# Patient Record
Sex: MALE | Race: OTHER | Employment: FULL TIME | URBAN - METROPOLITAN AREA
[De-identification: names, ages, dates, MRNs, and addresses within clinical notes are randomized per-mention and may not be internally consistent; named-entity substitution may affect disease eponyms.]

---

## 2024-06-14 ENCOUNTER — APPOINTMENT (OUTPATIENT)
Dept: CT IMAGING | Age: 29
End: 2024-06-14

## 2024-06-14 ENCOUNTER — HOSPITAL ENCOUNTER (EMERGENCY)
Age: 29
Discharge: HOME | End: 2024-06-14
Attending: EMERGENCY MEDICINE

## 2024-06-14 VITALS
SYSTOLIC BLOOD PRESSURE: 103 MMHG | RESPIRATION RATE: 16 BRPM | TEMPERATURE: 98.4 F | BODY MASS INDEX: 18.02 KG/M2 | HEART RATE: 80 BPM | DIASTOLIC BLOOD PRESSURE: 70 MMHG | WEIGHT: 148 LBS | OXYGEN SATURATION: 100 % | HEIGHT: 76 IN

## 2024-06-14 DIAGNOSIS — K72.90 HEPATIC INSUFFICIENCY (HCC): ICD-10-CM

## 2024-06-14 DIAGNOSIS — G40.909 SEIZURE DISORDER (HCC): Primary | ICD-10-CM

## 2024-06-14 LAB
ALBUMIN SERPL-MCNC: 4.6 GM/DL (ref 3.4–5)
ALCOHOL SCREEN SERUM: <0.01 %WT/VOL
ALP BLD-CCNC: 300 IU/L (ref 40–129)
ALT SERPL-CCNC: 190 U/L (ref 10–40)
AMPHETAMINES: NEGATIVE
ANION GAP SERPL CALCULATED.3IONS-SCNC: 19 MMOL/L (ref 7–16)
AST SERPL-CCNC: 556 IU/L (ref 15–37)
BACTERIA: ABNORMAL /HPF
BARBITURATE SCREEN URINE: NEGATIVE
BASOPHILS ABSOLUTE: 0 K/CU MM
BASOPHILS RELATIVE PERCENT: 1.1 % (ref 0–1)
BENZODIAZEPINE SCREEN, URINE: NEGATIVE
BILIRUB SERPL-MCNC: 1.4 MG/DL (ref 0–1)
BILIRUBIN, URINE: NEGATIVE MG/DL
BLOOD, URINE: NEGATIVE
BUN SERPL-MCNC: 4 MG/DL (ref 6–23)
CALCIUM SERPL-MCNC: 9.6 MG/DL (ref 8.3–10.6)
CANNABINOID SCREEN URINE: NEGATIVE
CAST TYPE: ABNORMAL /HPF
CHLORIDE BLD-SCNC: 97 MMOL/L (ref 99–110)
CLARITY: CLEAR
CO2: 20 MMOL/L (ref 21–32)
COCAINE METABOLITE: NEGATIVE
COLOR: YELLOW
COMMENT UA: ABNORMAL
CREAT SERPL-MCNC: 0.7 MG/DL (ref 0.9–1.3)
CRYSTAL TYPE: NEGATIVE /HPF
DIFFERENTIAL TYPE: ABNORMAL
EKG ATRIAL RATE: 82 BPM
EKG DIAGNOSIS: NORMAL
EKG P AXIS: 54 DEGREES
EKG P-R INTERVAL: 166 MS
EKG Q-T INTERVAL: 400 MS
EKG QRS DURATION: 90 MS
EKG QTC CALCULATION (BAZETT): 467 MS
EKG R AXIS: 28 DEGREES
EKG T AXIS: 54 DEGREES
EKG VENTRICULAR RATE: 82 BPM
EOSINOPHILS ABSOLUTE: 0 K/CU MM
EOSINOPHILS RELATIVE PERCENT: 1.1 % (ref 0–3)
EPITHELIAL CELLS, UA: NEGATIVE /HPF
FENTANYL URINE: NEGATIVE
GFR, ESTIMATED: >90 ML/MIN/1.73M2
GLUCOSE SERPL-MCNC: 237 MG/DL (ref 70–99)
GLUCOSE URINE: 250 MG/DL
HCT VFR BLD CALC: 41.8 % (ref 42–52)
HEMOGLOBIN: 13.9 GM/DL (ref 13.5–18)
IMMATURE NEUTROPHIL %: 0 % (ref 0–0.43)
INFLUENZA A ANTIGEN: NOT DETECTED
INFLUENZA B ANTIGEN: NOT DETECTED
KETONES, URINE: NEGATIVE MG/DL
LACTATE: 2.2 MMOL/L (ref 0.5–1.9)
LACTATE: 3.9 MMOL/L (ref 0.5–1.9)
LEUKOCYTE ESTERASE, URINE: NEGATIVE
LYMPHOCYTES ABSOLUTE: 0.5 K/CU MM
LYMPHOCYTES RELATIVE PERCENT: 28.3 % (ref 24–44)
MCH RBC QN AUTO: 31.3 PG (ref 27–31)
MCHC RBC AUTO-ENTMCNC: 33.3 % (ref 32–36)
MCV RBC AUTO: 94.1 FL (ref 78–100)
MONOCYTES ABSOLUTE: 0.2 K/CU MM
MONOCYTES RELATIVE PERCENT: 9.1 % (ref 0–4)
NEUTROPHILS ABSOLUTE: 1.1 K/CU MM
NEUTROPHILS RELATIVE PERCENT: 60.4 % (ref 36–66)
NITRITE URINE, QUANTITATIVE: NEGATIVE
OPIATES, URINE: NEGATIVE
OXYCODONE: NEGATIVE
PDW BLD-RTO: 13.7 % (ref 11.7–14.9)
PH, URINE: 6 (ref 5–8)
PLATELET # BLD: 86 K/CU MM (ref 140–440)
PMV BLD AUTO: 9.8 FL (ref 7.5–11.1)
POTASSIUM SERPL-SCNC: 3.9 MMOL/L (ref 3.5–5.1)
PROTEIN UA: ABNORMAL MG/DL
RBC # BLD: 4.44 M/CU MM (ref 4.6–6.2)
RBC URINE: NEGATIVE /HPF (ref 0–3)
SARS-COV-2 RDRP RESP QL NAA+PROBE: NOT DETECTED
SODIUM BLD-SCNC: 136 MMOL/L (ref 135–145)
SOURCE: NORMAL
SPECIFIC GRAVITY UA: 1.01 (ref 1–1.03)
TOTAL CK: 95 IU/L (ref 38–174)
TOTAL IMMATURE NEUTOROPHIL: 0 K/CU MM
TOTAL PROTEIN: 7.9 GM/DL (ref 6.4–8.2)
UROBILINOGEN, URINE: 1 MG/DL (ref 0.2–1)
WBC # BLD: 1.9 K/CU MM (ref 4–10.5)
WBC UA: <1 /HPF (ref 0–2)

## 2024-06-14 PROCEDURE — 80307 DRUG TEST PRSMV CHEM ANLYZR: CPT

## 2024-06-14 PROCEDURE — 82550 ASSAY OF CK (CPK): CPT

## 2024-06-14 PROCEDURE — G0480 DRUG TEST DEF 1-7 CLASSES: HCPCS

## 2024-06-14 PROCEDURE — 81001 URINALYSIS AUTO W/SCOPE: CPT

## 2024-06-14 PROCEDURE — 99284 EMERGENCY DEPT VISIT MOD MDM: CPT

## 2024-06-14 PROCEDURE — 80053 COMPREHEN METABOLIC PANEL: CPT

## 2024-06-14 PROCEDURE — 93010 ELECTROCARDIOGRAM REPORT: CPT | Performed by: INTERNAL MEDICINE

## 2024-06-14 PROCEDURE — 6360000002 HC RX W HCPCS: Performed by: EMERGENCY MEDICINE

## 2024-06-14 PROCEDURE — 96374 THER/PROPH/DIAG INJ IV PUSH: CPT

## 2024-06-14 PROCEDURE — 87635 SARS-COV-2 COVID-19 AMP PRB: CPT

## 2024-06-14 PROCEDURE — 87502 INFLUENZA DNA AMP PROBE: CPT

## 2024-06-14 PROCEDURE — 83605 ASSAY OF LACTIC ACID: CPT

## 2024-06-14 PROCEDURE — 85025 COMPLETE CBC W/AUTO DIFF WBC: CPT

## 2024-06-14 PROCEDURE — 87040 BLOOD CULTURE FOR BACTERIA: CPT

## 2024-06-14 PROCEDURE — 93005 ELECTROCARDIOGRAM TRACING: CPT | Performed by: EMERGENCY MEDICINE

## 2024-06-14 PROCEDURE — 70450 CT HEAD/BRAIN W/O DYE: CPT

## 2024-06-14 PROCEDURE — 2580000003 HC RX 258: Performed by: EMERGENCY MEDICINE

## 2024-06-14 PROCEDURE — 87389 HIV-1 AG W/HIV-1&-2 AB AG IA: CPT

## 2024-06-14 RX ORDER — LEVETIRACETAM 500 MG/1
500 TABLET ORAL 2 TIMES DAILY
Qty: 60 TABLET | Refills: 0 | Status: SHIPPED | OUTPATIENT
Start: 2024-06-14

## 2024-06-14 RX ORDER — 0.9 % SODIUM CHLORIDE 0.9 %
1000 INTRAVENOUS SOLUTION INTRAVENOUS ONCE
Status: COMPLETED | OUTPATIENT
Start: 2024-06-14 | End: 2024-06-14

## 2024-06-14 RX ORDER — LEVETIRACETAM 500 MG/5ML
1000 INJECTION, SOLUTION, CONCENTRATE INTRAVENOUS ONCE
Status: COMPLETED | OUTPATIENT
Start: 2024-06-14 | End: 2024-06-14

## 2024-06-14 RX ADMIN — SODIUM CHLORIDE 1000 ML: 9 INJECTION, SOLUTION INTRAVENOUS at 11:06

## 2024-06-14 RX ADMIN — SODIUM CHLORIDE 1000 ML: 9 INJECTION, SOLUTION INTRAVENOUS at 12:07

## 2024-06-14 RX ADMIN — LEVETIRACETAM 1000 MG: 500 INJECTION INTRAVENOUS at 11:07

## 2024-06-14 ASSESSMENT — PAIN - FUNCTIONAL ASSESSMENT: PAIN_FUNCTIONAL_ASSESSMENT: NONE - DENIES PAIN

## 2024-06-14 NOTE — DISCHARGE INSTR - COC
continuing treatment of the diagnosis listed and that he requires {Admit to Appropriate Level of Care:78069} for {GREATER/LESS:395064539} 30 days.     Update Admission H&P: {CHP DME Changes in HandP:449058229}    PHYSICIAN SIGNATURE:  {Esignature:[id number]}

## 2024-06-14 NOTE — ED NOTES
The patient presents to the er today with medics. The medics report that the patient had a seizure at his work. Nobody witnessed the seizure but he was \"found confused and foaming at the mouth.\"  When medics arrived he was initially very confused and trying to leave. On arrival he is alert to his name and place he did not know what year it was but was able to answer other questions appropriately. The call light is within reach.

## 2024-06-14 NOTE — ED NOTES
Discharge instruction and prescriptions were reviewed and the patient was given resources for follow up. The patient voiced understanding.

## 2024-06-14 NOTE — ED PROVIDER NOTES
eMERGENCY dEPARTMENT eNCOUnter        CHIEF COMPLAINT  Chief Complaint   Patient presents with    Seizures       hospitals    Luther Villalobos is a 29 y.o. male who presents with possible seizure.  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  At present he denies any headache, no chest pain, no shortness of breath, no abdominal pains, no recent illnesses, no focal neurological deficit no dizziness.  He did not eat this morning before going to work.  No old records available on this patient.    During the emergency department course patient appears to be slowly revealing that he drinks a lot he has a sister who lives in [place] can come by and pick him up he did have a primary physician but he retired.  Has not found a new physician yet  Currently he is on the Medicaid      REVIEW OF SYSTEMS    Cardiac: No chest pain or palpitations  Respiratory:No shortness of breath or new cough  General: No fevers or chills  : No dysuria or hematuria  GI: No vomiting or diarrhea  Musculoskeletal: Patient denies any shoulder pain or limited range of motion  See HPI for further details.  All other systems reviewed and are negative.    PAST MEDICAL OR SURGICAL HISTORY    History reviewed. No pertinent past medical history.  History reviewed. No pertinent surgical history.    CURRENT MEDICATIONS        ALLERGIES    No Known Allergies    FAMILY OR SOCIAL HISTORY    History reviewed. No pertinent family history.  Social History     Socioeconomic History

## 2024-06-16 LAB
CULTURE: NORMAL
CULTURE: NORMAL
HIV 1+2 AB+HIV1P24 AG SERPLBLD IA.RAPID: NON REACTIVE
Lab: NORMAL
Lab: NORMAL
SPECIMEN: NORMAL
SPECIMEN: NORMAL

## 2024-06-19 LAB
CULTURE: NORMAL
CULTURE: NORMAL
Lab: NORMAL
Lab: NORMAL
SPECIMEN: NORMAL
SPECIMEN: NORMAL